# Patient Record
Sex: MALE | Race: WHITE | HISPANIC OR LATINO
[De-identification: names, ages, dates, MRNs, and addresses within clinical notes are randomized per-mention and may not be internally consistent; named-entity substitution may affect disease eponyms.]

---

## 2018-09-13 PROBLEM — Z00.00 ENCOUNTER FOR PREVENTIVE HEALTH EXAMINATION: Status: ACTIVE | Noted: 2018-09-13

## 2018-10-13 ENCOUNTER — APPOINTMENT (OUTPATIENT)
Dept: HUMAN REPRODUCTION | Facility: CLINIC | Age: 38
End: 2018-10-13
Payer: COMMERCIAL

## 2018-10-13 PROCEDURE — 89322 SEMEN ANAL STRICT CRITERIA: CPT

## 2018-11-15 ENCOUNTER — APPOINTMENT (OUTPATIENT)
Dept: HUMAN REPRODUCTION | Facility: CLINIC | Age: 38
End: 2018-11-15
Payer: COMMERCIAL

## 2018-11-15 PROCEDURE — 89322 SEMEN ANAL STRICT CRITERIA: CPT

## 2023-03-04 ENCOUNTER — RX ONLY (RX ONLY)
Age: 43
End: 2023-03-04

## 2023-03-04 ENCOUNTER — OFFICE (OUTPATIENT)
Dept: URBAN - METROPOLITAN AREA CLINIC 104 | Facility: CLINIC | Age: 43
Setting detail: OPHTHALMOLOGY
End: 2023-03-04
Payer: COMMERCIAL

## 2023-03-04 DIAGNOSIS — H50.21: ICD-10-CM

## 2023-03-04 DIAGNOSIS — H16.223: ICD-10-CM

## 2023-03-04 PROCEDURE — 92004 COMPRE OPH EXAM NEW PT 1/>: CPT | Performed by: OPTOMETRIST

## 2023-03-04 ASSESSMENT — TONOMETRY
OD_IOP_MMHG: 16
OS_IOP_MMHG: 16

## 2023-03-04 ASSESSMENT — CONFRONTATIONAL VISUAL FIELD TEST (CVF)
OD_FINDINGS: FULL
OS_FINDINGS: FULL

## 2023-03-04 ASSESSMENT — KERATOMETRY
OD_K1POWER_DIOPTERS: 41.51
OS_K2POWER_DIOPTERS: 41.82
OS_K1POWER_DIOPTERS: 41.51
OD_K2POWER_DIOPTERS: 41.87
OS_AXISANGLE_DEGREES: 087
OD_AXISANGLE_DEGREES: 060

## 2023-03-04 ASSESSMENT — VISUAL ACUITY
OD_BCVA: 20/20
OS_BCVA: 20/20

## 2023-03-04 ASSESSMENT — REFRACTION_AUTOREFRACTION
OS_SPHERE: -0.25
OD_AXIS: 146
OD_CYLINDER: -0.75
OS_AXIS: 147
OS_CYLINDER: -0.50
OD_SPHERE: 0.00

## 2023-03-04 ASSESSMENT — SUPERFICIAL PUNCTATE KERATITIS (SPK)
OS_SPK: 2+
OD_SPK: 1+

## 2023-03-04 ASSESSMENT — AXIALLENGTH_DERIVED
OS_AL: 24.49
OD_AL: 24.43

## 2023-03-04 ASSESSMENT — SPHEQUIV_DERIVED
OS_SPHEQUIV: -0.5
OD_SPHEQUIV: -0.375

## 2023-03-23 ENCOUNTER — OFFICE (OUTPATIENT)
Dept: URBAN - METROPOLITAN AREA CLINIC 114 | Facility: CLINIC | Age: 43
Setting detail: OPHTHALMOLOGY
End: 2023-03-23
Payer: COMMERCIAL

## 2023-03-23 DIAGNOSIS — H53.2: ICD-10-CM

## 2023-03-23 PROBLEM — H16.223 DRY EYE SYNDROME K SICCA; BOTH EYES: Status: ACTIVE | Noted: 2023-03-04

## 2023-03-23 PROCEDURE — 99213 OFFICE O/P EST LOW 20 MIN: CPT | Performed by: SPECIALIST

## 2023-03-23 ASSESSMENT — SUPERFICIAL PUNCTATE KERATITIS (SPK)
OS_SPK: 2+
OD_SPK: 1+

## 2023-03-23 ASSESSMENT — REFRACTION_MANIFEST
OS_VA2: 20/20(J1+)
OD_VA1: 20/20
OS_VA1: 20/20
OD_CYLINDER: -0.25
OS_ADD: +1.25
OD_ADD: +1.25
OD_AXIS: 125
OS_SPHERE: +0.25
OS_CYLINDER: SPHERE
OD_VA2: 20/20(J1+)
OD_SPHERE: +0.25

## 2023-03-23 ASSESSMENT — REFRACTION_AUTOREFRACTION
OS_CYLINDER: -0.25
OS_AXIS: 095
OS_SPHERE: +0.25
OD_SPHERE: +0.25
OD_AXIS: 126
OD_CYLINDER: -0.50

## 2023-03-23 ASSESSMENT — SPHEQUIV_DERIVED
OD_SPHEQUIV: 0
OD_SPHEQUIV: 0.125
OS_SPHEQUIV: 0.125

## 2023-03-23 ASSESSMENT — CONFRONTATIONAL VISUAL FIELD TEST (CVF)
OS_FINDINGS: FULL
OD_FINDINGS: FULL

## 2023-03-23 ASSESSMENT — VISUAL ACUITY
OD_BCVA: 20/20
OS_BCVA: 20/20

## 2023-08-21 ENCOUNTER — EMERGENCY (EMERGENCY)
Facility: HOSPITAL | Age: 43
LOS: 1 days | Discharge: HOME | End: 2023-08-21
Attending: EMERGENCY MEDICINE
Payer: COMMERCIAL

## 2023-08-21 VITALS
WEIGHT: 184.97 LBS | RESPIRATION RATE: 18 BRPM | DIASTOLIC BLOOD PRESSURE: 84 MMHG | HEIGHT: 66 IN | SYSTOLIC BLOOD PRESSURE: 125 MMHG | HEART RATE: 67 BPM | TEMPERATURE: 97 F

## 2023-08-21 PROCEDURE — 72125 CT NECK SPINE W/O DYE: CPT | Mod: MG

## 2023-08-21 PROCEDURE — 70450 CT HEAD/BRAIN W/O DYE: CPT | Mod: 26,MG

## 2023-08-21 PROCEDURE — G1004: CPT

## 2023-08-21 PROCEDURE — 99284 EMERGENCY DEPT VISIT MOD MDM: CPT

## 2023-08-21 PROCEDURE — 72125 CT NECK SPINE W/O DYE: CPT | Mod: 26,MG

## 2023-08-21 PROCEDURE — 99284 EMERGENCY DEPT VISIT MOD MDM: CPT | Mod: 25

## 2023-08-21 PROCEDURE — 70450 CT HEAD/BRAIN W/O DYE: CPT | Mod: MG

## 2023-08-21 RX ORDER — ACETAMINOPHEN 500 MG
975 TABLET ORAL ONCE
Refills: 0 | Status: COMPLETED | OUTPATIENT
Start: 2023-08-21 | End: 2023-08-21

## 2023-08-21 RX ADMIN — Medication 975 MILLIGRAM(S): at 13:03

## 2023-08-21 NOTE — ED PROVIDER NOTE - PHYSICAL EXAMINATION
Constitutional: Awake, alert and oriented. In no acute distress. Well appearing.  HEENT: NC/AT. Moist mucous membranes.  Eyes: No scleral icterus. EOMI.  Neck:. Soft and supple. Full ROM without pain. No midline cervical tenderness and no step offs deformities.   Cardiac: Regular rate and regular rhythm. +S1/S2. Peripheral pulses 2+ and symmetric. No LE edema.  Respiratory: Speaking in full sentences. No evidence of respiratory distress. No wheezes, rales or rhonchi.  Abdomen: Soft, non-distended and non tender Normal bowel sounds in all 4 quadrants. No guarding or rebound. no CVAT  Back: No midline thoracic/lumbar tenderness.  No step offs deformities. No induration/fluctuance/redness/warmth/drainage over thoracic/lumbar region. DP 2+ bilateral lower extremities..  Skin: No rashes, abrasions or lacerations.  Lymph: No cervical lymphadenopathy.  Neuro: Awake, alert & oriented x 3. Moves all extremities symmetrically. Negative pronator drift, strength 5/5 all upper and lower extremities, sensation to light touch intact throughout upper/ lower extremities and face, finger to nose coordination intact, cranial nerves 2-12 intact  Psych: calm, cooperative, normal affect

## 2023-08-21 NOTE — ED PROVIDER NOTE - PATIENT PORTAL LINK FT
You can access the FollowMyHealth Patient Portal offered by [facility] by registering at the following website: [email or web address]. By joining [email or web address]’s FollowMyHealth portal, you will also be able to view your health information using other applications (apps) compatible with our system.

## 2023-08-21 NOTE — ED PROVIDER NOTE - CLINICAL SUMMARY MEDICAL DECISION MAKING FREE TEXT BOX
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 Will rule out traumatic injury. Tylenol and CT head/C-spine without any acute findings.  F/u outpatient neuro/pcp in 1 week.  Strict ED return precautions given if any new or worsening symptoms.

## 2023-08-21 NOTE — ED PROVIDER NOTE - OBJECTIVE STATEMENT
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 Denies any LOC. Denies any changes in vision/diplopia, neck pain, n/v, fever/chills, numbness over extremities, sob, cp, cough, abdominal pain, back pain, rash, ear pain/tinnitus, and with no other c/o.  Of note, pt was wearing a cap.

## 2023-08-21 NOTE — ED PROVIDER NOTE - ATTENDING APP SHARED VISIT CONTRIBUTION OF CARE
Nam KINNEY- 42-year-old male with no medical problems presents with injury to his head when he was entering the hospital and a part of the ceiling fell on his head.  Patient did not fall to the ground no LOC and was brought in immediately to the ED.  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    Patient is alert well-appearing male, S1-S2 normal regular, bilateral clear breath sounds, abdomen soft nontender nondistended, neuro exam is alert oriented x3 no focal deficits, normal gait, skin warm dry good turgor, scalp is nontender no hematoma no midline tenderness of the neck neck is soft and supple    Patient has significant mechanism of injury with the paraspinal ceiling falling on his head we will do a CT head and neck.  Patient was reassured of no serious injury CT head and neck were negative patient advised that he should rest as he may have more pain to next day from the muscle spasms

## 2023-08-21 NOTE — ED PROVIDER NOTE - CARE PROVIDER_API CALL
Rob Vasques  Neurology  [address] [place] [id number]  Phone: [phone]  Fax: [phone]  Follow Up Time: 7-10 Days

## 2023-08-21 NOTE — ED ADULT TRIAGE NOTE - CHIEF COMPLAINT QUOTE
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

## 2023-08-21 NOTE — ED PROVIDER NOTE - NSFOLLOWUPINSTRUCTIONS_ED_ALL_ED_FT
Please take tylenol or motrin 600mg every 6 hours with food as needed for pain  1)Follow up with your PCP in 1 week  2)Follow up with neurology clinic in 1 week  Return to the emergency room if you are experiencing any new or worsening symptoms    SEEK IMMEDIATE MEDICAL CARE IF YOU HAVE ANY OF THE FOLLOWING SYMPTOMS: fever, vomiting, stiff neck, loss of vision, problems with speech, muscle weakness, loss of balance, trouble walking, passing out, or confusion.

## 2024-07-16 ENCOUNTER — NON-APPOINTMENT (OUTPATIENT)
Age: 44
End: 2024-07-16